# Patient Record
Sex: FEMALE | Race: BLACK OR AFRICAN AMERICAN
[De-identification: names, ages, dates, MRNs, and addresses within clinical notes are randomized per-mention and may not be internally consistent; named-entity substitution may affect disease eponyms.]

---

## 2017-02-01 ENCOUNTER — HOSPITAL ENCOUNTER (EMERGENCY)
Dept: HOSPITAL 74 - JERFT | Age: 32
Discharge: HOME | End: 2017-02-01
Payer: COMMERCIAL

## 2017-02-01 VITALS — SYSTOLIC BLOOD PRESSURE: 145 MMHG | TEMPERATURE: 98 F | HEART RATE: 93 BPM | DIASTOLIC BLOOD PRESSURE: 82 MMHG

## 2017-02-01 VITALS — BODY MASS INDEX: 26.6 KG/M2

## 2017-02-01 DIAGNOSIS — S39.012A: Primary | ICD-10-CM

## 2017-02-01 DIAGNOSIS — D64.9: ICD-10-CM

## 2017-02-01 DIAGNOSIS — I10: ICD-10-CM
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
## 2017-02-01 LAB
ANION GAP SERPL CALC-SCNC: 10 MMOL/L (ref 8–16)
APPEARANCE UR: CLEAR
BASOPHILS # BLD: 1 % (ref 0–2)
BILIRUB UR STRIP.AUTO-MCNC: NEGATIVE MG/DL
CALCIUM SERPL-MCNC: 9 MG/DL (ref 8.5–10.1)
CO2 SERPL-SCNC: 28 MMOL/L (ref 21–32)
COLOR UR: (no result)
CREAT SERPL-MCNC: 1 MG/DL (ref 0.55–1.02)
DEPRECATED RDW RBC AUTO: 13.7 % (ref 11.6–15.6)
EOSINOPHIL # BLD: 2.4 % (ref 0–4.5)
GLUCOSE SERPL-MCNC: 102 MG/DL (ref 74–106)
HYALINE CASTS URNS QL MICRO: 1 /LPF
KETONES UR QL STRIP: NEGATIVE
LEUKOCYTE ESTERASE UR QL STRIP.AUTO: NEGATIVE
MCH RBC QN AUTO: 30.4 PG (ref 25.7–33.7)
MCHC RBC AUTO-ENTMCNC: 34.7 G/DL (ref 32–36)
MCV RBC: 87.5 FL (ref 80–96)
MUCOUS THREADS URNS QL MICRO: (no result)
NEUTROPHILS # BLD: 48.8 % (ref 42.8–82.8)
NITRITE UR QL STRIP: NEGATIVE
PH UR: 5 [PH] (ref 5–8)
PLATELET # BLD AUTO: 268 K/MM3 (ref 134–434)
PMV BLD: 10 FL (ref 7.5–11.1)
PROT UR QL STRIP: NEGATIVE
PROT UR QL STRIP: NEGATIVE
RBC # BLD AUTO: 17 /HPF (ref 0–3)
RBC # UR STRIP: (no result) /UL
SP GR UR: 1.02 (ref 1–1.03)
UROBILINOGEN UR STRIP-MCNC: NEGATIVE E.U./DL (ref 0.2–1)
WBC # BLD AUTO: 6.4 K/MM3 (ref 4–10)
WBC # UR AUTO: 2 /HPF (ref 3–5)

## 2017-02-01 NOTE — PDOC
History of Present Illness





- General


Chief Complaint: Pain


Stated Complaint: LOWER BACK PAIN, HIGH BP


Time Seen by Provider: 02/01/17 12:11


History Source: Patient


Exam Limitations: No Limitations





- History of Present Illness


Initial Comments: 





02/01/17 13:15


CHIEF COMPLAINT: Lower back pain b/l intermittent for one week 





HISTORY OF PRESENT ILLNESS:  Patient is a 31-year-old female with a history of 

hypertension and anemia here today complaining of bilateral lower back pain for 

one week with movement. Patient denies any radiation of pain down the legs or 

any incontinency or any saddle anesthesia. Patient denies any injuries however 

patient does  her 4 year old daughter without using proper lifting 

technique of bending her legs she bends over and uses her back muscles. Patient 

was worried that back problem might indicate that she has a problem with her 

kidneys patient googled information and was worried about her kidneys as a 

result of her search. 








Occurred: reports: other (one week )


Severity: reports: moderate


Pain Location: reports: back (b/l lower back )


Method of Injury: Yes: other (lifting her 5 y/o daughter )


Modifying Factors: improves with: None


Loss of Consciousness: no loss of consciousness


Associated Symptoms (Fall): denies symptoms





Past History





- Past Medical History


Allergies/Adverse Reactions: 


 Allergies











Allergy/AdvReac Type Severity Reaction Status Date / Time


 


No Known Allergies Allergy   Verified 02/01/17 11:47











Home Medications: 


Ambulatory Orders





NK [No Known Home Medication]  02/01/17 








HTN: Yes





- Psycho/Social/Smoking Cessation Hx


Anxiety: No


Suicidal Ideation: No


Smoking History: Never smoked


Hx Alcohol Use: Yes (SOCIAL)


Drug/Substance Use Hx: No


Substance Use Type: None





**Review of Systems





- Review of Systems


Able to Perform ROS?: Yes


Constitutional: No: Symptoms Reported


HEENTM: No: Symptoms Reported


Respiratory: No: Symptoms reported


Cardiac (ROS): No: Symptoms Reported


ABD/GI: No: Symptoms Reported


: No: Symptoms Reported


Musculoskeletal: Yes: Back Pain (b/l lower back )


Integumentary: No: Symptoms Reported


Neurological: No: Symptoms reported





*Physical Exam





- Vital Signs


 Last Vital Signs











Temp Pulse Resp BP Pulse Ox


 


 98.0 F   93 H  20   145/82   100 


 


 02/01/17 11:44  02/01/17 11:44  02/01/17 11:44  02/01/17 11:44  02/01/17 11:44














- Physical Exam


General Appearance: Yes: Appropriately Dressed


Respiratory/Chest: positive: Lungs Clear, Normal Breath Sounds.  negative: 

Chest Tender, Respiratory Distress


Cardiovascular: positive: Regular Rhythm, Regular Rate, S1, S2


Gastrointestinal/Abdominal: positive: Normal Bowel Sounds, Soft.  negative: 

Tender, Organomegaly, Distended, Guarding, Rebound, Tenderness, Hepatomegaly, 

Spleenomegaly


Musculoskeletal: positive: Normal Inspection, Other (b/l ).  negative: CVA 

Tenderness, CVA Tenderness (R), CVA Tenderness (L), Decreased Range of Motion, 

Vertebral Tenderness


Extremity: positive: Normal Capillary Refill, Normal Inspection, Normal Range 

of Motion


Integumentary: positive: Normal Color


Neurologic: positive: Alert, Normal Response, Respond to painful stimul, 

Responsive, Other (negative SLR b/l ).  negative: Motor Strength 5/5, Numbness, 

Sensory Deficit





ED Treatment Course





- LABORATORY


CBC & Chemistry Diagram: 


 02/01/17 12:50





 02/01/17 12:50





Medical Decision Making





- Medical Decision Making


02/01/17 13:16


 Patient is a 31-year-old female with a history of hypertension and anemia here 

today complaining of bilateral lower back pain for one week with movement. 

Patient denies any radiation of pain down the legs or any incontinency or any 

saddle anesthesia. Patient denies any injuries however patient does  her 

4 year old daughter without using proper lifting technique of bending her legs 

she bends over and uses her back muscles. Patient was worried that back problem 

might indicate that she has a problem with her kidneys patient googled 

information and was worried about her kidneys as a result of her search. Pt.has 

her menses presently 











Right lateral lower back pain strain 











plan:











CBC with differential


bmp 





urinalysis 


urine hcg 





acetaminophen 1000 mg 








 Laboratory Tests











  02/01/17 02/01/17 02/01/17





  12:50 12:50 12:50


 


WBC   6.4 


 


RBC   3.93 


 


Hgb   12.0 


 


Hct   34.4 


 


MCV   87.5 


 


MCHC   34.7 


 


RDW   13.7 


 


Plt Count   268 


 


MPV   10.0 


 


Neutrophils %   48.8 


 


Lymphocytes %   39.5 


 


Monocytes %   8.3 


 


Eosinophils %   2.4 


 


Basophils %   1.0 


 


Urine Color    Ltyellow


 


Urine Appearance    Clear


 


Urine pH    5.0


 


Ur Specific Gravity    1.019


 


Urine Protein    Negative


 


Urine Glucose (UA)    Negative


 


Urine Ketones    Negative


 


Urine Blood    3+ H


 


Urine Nitrite    Negative


 


Urine Bilirubin    Negative


 


Urine Urobilinogen    Negative


 


Ur Leukocyte Esterase    Negative


 


Urine HCG, Qual  Negative  

















02/01/17 13:17


 Laboratory Tests











  02/01/17





  12:50


 


Sodium  140


 


Potassium  3.7


 


Chloride  102


 


Carbon Dioxide  28


 


Anion Gap  10


 


BUN  13


 


Creatinine  1.0


 


Random Glucose  102


 


Calcium  9.0














02/01/17 13:18








02/01/17 13:37








02/01/17 14:12








02/01/17 14:15








*DC/Admit/Observation/Transfer


Diagnosis at time of Disposition: 


Low back strain


Qualifiers:


 Encounter type: initial encounter Qualified Code(s): S39.012A - Strain of 

muscle, fascia and tendon of lower back, initial encounter





- Discharge Dispostion


Disposition: HOME


Condition at time of disposition: Stable





- Referrals


Referrals: 


Eddie Kwan MD [Primary Care Provider] - 





- Patient Instructions


Additional Instructions: 














Use proper lifting technique swelling lifting any thing as shown here in 

emergency room











Return to emergency room if pain worsens or any radiation of pain down the legs 

or any incontinency or numbness of legs or groin














Take acetaminophen as needed as directed by  for pain














Follow-up with your primary care provider within the next few days

















patient voiced understanding of discharge instructions and all questions were 

answered





- Post Discharge Activity


Work/School Note:  Back to Work

## 2017-05-30 ENCOUNTER — HOSPITAL ENCOUNTER (EMERGENCY)
Dept: HOSPITAL 74 - JER | Age: 32
Discharge: HOME | End: 2017-05-30
Payer: COMMERCIAL

## 2017-05-30 VITALS — DIASTOLIC BLOOD PRESSURE: 84 MMHG | SYSTOLIC BLOOD PRESSURE: 138 MMHG | TEMPERATURE: 98.3 F | HEART RATE: 66 BPM

## 2017-05-30 VITALS — BODY MASS INDEX: 26.6 KG/M2

## 2017-05-30 DIAGNOSIS — G44.219: Primary | ICD-10-CM

## 2017-05-30 DIAGNOSIS — I15.8: ICD-10-CM

## 2017-05-30 LAB
ALBUMIN SERPL-MCNC: 4 G/DL (ref 3.4–5)
ALP SERPL-CCNC: 58 U/L (ref 45–117)
ALT SERPL-CCNC: 18 U/L (ref 12–78)
ANION GAP SERPL CALC-SCNC: 5 MMOL/L (ref 8–16)
APPEARANCE UR: CLEAR
AST SERPL-CCNC: 14 U/L (ref 15–37)
BASOPHILS # BLD: 0.9 % (ref 0–2)
BILIRUB SERPL-MCNC: 0.2 MG/DL (ref 0.2–1)
BILIRUB UR STRIP.AUTO-MCNC: NEGATIVE MG/DL
CALCIUM SERPL-MCNC: 9.1 MG/DL (ref 8.5–10.1)
CO2 SERPL-SCNC: 29 MMOL/L (ref 21–32)
COCKROFT - GAULT: 115.66
COLOR UR: YELLOW
CREAT SERPL-MCNC: 0.9 MG/DL (ref 0.55–1.02)
DEPRECATED RDW RBC AUTO: 13.9 % (ref 11.6–15.6)
EOSINOPHIL # BLD: 2.8 % (ref 0–4.5)
GLUCOSE SERPL-MCNC: 86 MG/DL (ref 74–106)
KETONES UR QL STRIP: NEGATIVE
LEUKOCYTE ESTERASE UR QL STRIP.AUTO: NEGATIVE
MCH RBC QN AUTO: 28.9 PG (ref 25.7–33.7)
MCHC RBC AUTO-ENTMCNC: 33.7 G/DL (ref 32–36)
MCV RBC: 85.8 FL (ref 80–96)
MUCOUS THREADS URNS QL MICRO: (no result)
NEUTROPHILS # BLD: 51.3 % (ref 42.8–82.8)
NITRITE UR QL STRIP: NEGATIVE
PH UR: 6 [PH] (ref 5–8)
PLATELET # BLD AUTO: 252 K/MM3 (ref 134–434)
PLATELET # BLD EST: ADEQUATE 10*3/UL
PLATELET COMMENT2: (no result)
PLATELET COMMENT3: (no result)
PMV BLD: 10.5 FL (ref 7.5–11.1)
PROT SERPL-MCNC: 8.1 G/DL (ref 6.4–8.2)
PROT UR QL STRIP: (no result)
PROT UR QL STRIP: NEGATIVE
RBC # BLD AUTO: 153 /HPF (ref 0–3)
RBC # UR STRIP: (no result) /UL
SP GR UR: 1.02 (ref 1–1.02)
TROPONIN I SERPL-MCNC: < 0.02 NG/ML (ref 0–0.05)
UROBILINOGEN UR STRIP-MCNC: NEGATIVE E.U./DL (ref 0.2–1)
WBC # BLD AUTO: 7.4 K/MM3 (ref 4–10)
WBC # UR AUTO: 4 /HPF (ref 3–5)

## 2017-05-30 PROCEDURE — 3E033GC INTRODUCTION OF OTHER THERAPEUTIC SUBSTANCE INTO PERIPHERAL VEIN, PERCUTANEOUS APPROACH: ICD-10-PCS

## 2017-05-30 NOTE — PDOC
History of Present Illness





- General


Chief Complaint: Lightheaded


Stated Complaint: HEADACHE


Time Seen by Provider: 05/30/17 19:31


History Source: Patient


Exam Limitations: No Limitations





- History of Present Illness


Initial Comments: 





05/30/17 20:35





31yo Female patient w/ PmHx: HTN presents to ED c/o h/a x 2 weeks getting worse

, with some trouble swallowing. Patient states she visited with her PMD and 

states he will not take me serious. Associated dizziness, seeing black spots. 

LNMP: Current. + Family hx: HTN, and "Renal problems."


Timing/Duration: reports: other (2 Weeks)


Severity: Yes: moderate


Associated Symptoms: denies: denies symptoms, confusion, fatigue, fever/chills, 

insomnia, loss of consciousness, muscle spasms, nausea/vomiting, numbness in 

legs/feet, paresthesia, ringing in ears, seizures, sleepy, slurred speech, 

tingling in legs/feet, trouble walking, vision changes, weakness, other





Past History





- Travel


Traveled outside of the country in the last 30 days: No


Close contact w/someone who was outside of country & ill: No





- Past Medical History


Allergies/Adverse Reactions: 


 Allergies











Allergy/AdvReac Type Severity Reaction Status Date / Time


 


No Known Allergies Allergy   Verified 05/30/17 19:31











Home Medications: 


Ambulatory Orders





Acetaminophen/Caffeine/Butalb [Fioricet -] 2 tab PO Q6H PRN #42 tablet MDD 8 

tabs 05/30/17 


Lisinopril 0 mg PO DAILY 05/30/17 


Metoprolol Tartrate [Lopressor -] 25 mg PO ASDIR PRN #30 tablet 05/30/17 








HTN: Yes





- Psycho/Social/Smoking Cessation Hx


Anxiety: No


Suicidal Ideation: No


Smoking History: Never smoked


Information on smoking cessation initiated: No


Hx Alcohol Use: No


Drug/Substance Use Hx: No


Substance Use Type: None





Neuro Specific PMHX





- Complaint Specific PMHX


Glaucoma: No


Herniated Disk: No


Laminectomy: No


Migraine: No


Multiple Sclerosis: No


Neuropathy: No


TIA: No





**Review of Systems





- Review of Systems


Able to Perform ROS?: Yes


Is the patient limited English proficient: No


Constitutional: No: Chills, Fever


HEENTM: Yes: Difficulty Swallowing, Other (Seeing Black Spots).  No: Blurred 

Vision, Recent change in vision, Double Vision


Respiratory: No: Shortness of Breath, Stridor, Wheezing


Cardiac (ROS): No: Chest Pain, Chest Tightness


ABD/GI: No: Constipated, Diarrhea, Nausea, Poor Appetite, Poor Fluid Intake, 

Vomiting


: No: Burning, Dysuria, Flank Pain, Hematuria, Pain


Musculoskeletal: No: Back Pain


Integumentary: No: Bruising, Erythema, Pruritus, Rash, Sweating


Neurological: Yes: Headache, Dizziness.  No: Numbness, Paresthesia, Seizure, 

Tingling, Tremors, Weakness, Unsteady Gait, Ataxia


All Other Systems: Reviewed and Negative





*Physical Exam





- Vital Signs


 Last Vital Signs











Temp Pulse Resp BP Pulse Ox


 


 97.3 F L  86   16   160/116   100 


 


 05/30/17 18:23  05/30/17 19:26  05/30/17 19:26  05/30/17 19:26  05/30/17 19:26














- Physical Exam


General Appearance: Yes: Nourished, Appropriately Dressed.  No: Apparent 

Distress, Mild Distress, Moderate Distress, Severe Distress


HEENT: positive: EOMI, MARGARET, Normal ENT Inspection, Normal Voice, Symmetrical, 

TMs Normal, Pharynx Normal.  negative: Photophobia, Pharyngeal Erythema, 

Tonsillar Exudate, Tonsillar Erythema, Nasal Congestion, Rhinorrhea, Sinus 

Tenderness, TM Bulging, TM Dull, TM Erythema


Neck: positive: Trachea midline, Normal Thyroid, Supple.  negative: Stridor, 

Lymphadenopathy (R), Lymphadenopathy (L), Tender lateral, Tender midline


Respiratory/Chest: positive: Lungs Clear, Normal Breath Sounds.  negative: 

Respiratory Distress, Accessory Muscle Use, Labored Respiration, Rapid RR, 

Crackles, Rales, Rhonchi, Stridor, Wheezing


Cardiovascular: positive: Regular Rhythm, Regular Rate


Gastrointestinal/Abdominal: positive: Normal Bowel Sounds, Soft.  negative: 

Tender, Distended, Guarding, Rebound, Tenderness


Musculoskeletal: positive: Normal Inspection.  negative: CVA Tenderness, 

Decreased Range of Motion, Vertebral Tenderness


Extremity: positive: Normal Capillary Refill, Normal Inspection, Normal Range 

of Motion, Pelvis Stable.  negative: Pedal Edema, Swelling, Calf Tenderness, 

Erythema, Inflammation


Integumentary: positive: Normal Color, Dry, Warm


Neurologic: positive: CNs II-XII NML intact, Fully Oriented, Alert, Normal Mood/

Affect, Normal Response, Motor Strength 5/5





ED Treatment Course





- LABORATORY


CBC & Chemistry Diagram: 


 05/30/17 20:20





 05/30/17 20:20





- RADIOLOGY


Radiology Studies Ordered: 














 Category Date Time Status


 


 HEAD CT WITHOUT CONTRAST [CT] Stat CT Scan  05/30/17 20:04 Ordered














*DC/Admit/Observation/Transfer


Diagnosis at time of Disposition: 


Headache


Qualifiers:


 Headache type: tension-type Headache chronicity pattern: episodic headache 

Intractability: not intractable Qualified Code(s): G44.219 - Episodic tension-

type headache, not intractable





Hypertension


Qualifiers:


 Hypertension type: other secondary hypertension Qualified Code(s): I15.8 - 

Other secondary hypertension





- Discharge Dispostion


Disposition: HOME


Condition at time of disposition: Improved


Admit: No





- Prescriptions


Prescriptions: 


Acetaminophen/Caffeine/Butalb [Fioricet -] 2 tab PO Q6H PRN #42 tablet MDD 8 

tabs


 PRN Reason: Headache


Metoprolol Tartrate [Lopressor -] 25 mg PO ASDIR PRN #30 tablet


 PRN Reason: Hypertension





- Referrals


Referrals: 


Eddie Kwan MD [Primary Care Provider] - 


Juan Manuel Zendejas MD [Staff Physician] - 





- Patient Instructions


Printed Discharge Instructions:  High Blood Pressure, DI for Headache


Additional Instructions: 


FOLLOW UP WITH DR. ZENDEJAS (CARDIOLOGY) REGARDING HIGH BLOOD PRESSURE. CALL 

TO SCHEDULE APPOINTMENT. TAKE MEDICATIONS AS PRESCRIBED. TAKE LOPRESSOR 25 MG 

DAILY AS NEEDED FOR HIGH BLOOD PRESSURE READINGS AFTER TAKING ROUTINE 

MEDICATIONS. FIORICET FOR HEADACHE, DO NOT TAKE EXTRA TYLENOL WITH THIS 

MEDICATION. DRINK PLENTY WATER. RETURN IF ANY CONCERNS FOR FURTHER EVALUATION.





- Post Discharge Activity


Work/School Note:  Back to Work

## 2017-05-30 NOTE — PDOC
*Physical Exam





- Vital Signs


 Last Vital Signs











Temp Pulse Resp BP Pulse Ox


 


 97.3 F L  86   16   160/116   100 


 


 05/30/17 18:23  05/30/17 19:26  05/30/17 19:26  05/30/17 19:26  05/30/17 19:26














ED Treatment Course





- LABORATORY


CBC & Chemistry Diagram: 


 05/30/17 20:20





 05/30/17 20:20





- ADDITIONAL ORDERS


Additional order review: 


 Laboratory  Results











  05/30/17





  20:20


 


Sodium  140


 


Potassium  4.6  D


 


Chloride  106


 


Carbon Dioxide  29


 


Anion Gap  5 L


 


BUN  13


 


Creatinine  0.9


 


Creat Clearance w eGFR  > 60


 


Random Glucose  86


 


Calcium  9.1


 


Total Bilirubin  0.2


 


AST  14 L


 


ALT  18


 


Alkaline Phosphatase  58


 


Creatine Kinase  110


 


Troponin I  < 0.02


 


Total Protein  8.1


 


Albumin  4.0








 











  05/30/17





  20:20


 


RBC  4.24


 


MCV  85.8


 


MCHC  33.7


 


RDW  13.9


 


MPV  10.5


 


Neutrophils %  51.3


 


Lymphocytes %  36.8


 


Monocytes %  8.2


 


Eosinophils %  2.8


 


Basophils %  0.9














- Medications


Given in the ED: 


ED Medications














Discontinued Medications














Generic Name Dose Route Start Last Admin





  Trade Name Freq  PRN Reason Stop Dose Admin


 


Labetalol HCl  5 mg 05/30/17 20:48 05/30/17 20:55





  Normodyne Injection -  IVPUSH 05/30/17 20:49  5 mg





  ONCE ONE   Administration














Medical Decision Making





- Medical Decision Making





05/30/17 20:59


agree with care from [facility]





*DC/Admit/Observation/Transfer


Diagnosis at time of Disposition: 


 Headache, Hypertension





- Prescriptions


Prescriptions: 


Acetaminophen/Caffeine/Butalb [Fioricet -] 2 tab PO Q6H PRN #42 tablet MDD 8 

tabs


 PRN Reason: Headache


Metoprolol Tartrate [Lopressor -] 25 mg PO ASDIR PRN #30 tablet


 PRN Reason: Hypertension





- Referrals


Referrals: 


Juan Manuel Zendejas MD [Staff Physician] - 


Eddie Kwan MD [Primary Care Provider] - 





- Patient Instructions


Printed Discharge Instructions:  High Blood Pressure, DI for Headache


Additional Instructions: 


FOLLOW UP WITH DR. ZENDEJAS (CARDIOLOGY) REGARDING HIGH BLOOD PRESSURE. CALL 

TO SCHEDULE APPOINTMENT. TAKE MEDICATIONS AS PRESCRIBED. TAKE LOPRESSOR 25 MG 

DAILY AS NEEDED FOR HIGH BLOOD PRESSURE READINGS AFTER TAKING ROUTINE 

MEDICATIONS. FIORICET FOR HEADACHE, DO NOT TAKE EXTRA TYLENOL WITH THIS 

MEDICATION. DRINK PLENTY WATER. RETURN IF ANY CONCERNS FOR FURTHER EVALUATION.





- Post Discharge Activity


Work/School Note:  Back to Work

## 2017-09-05 PROBLEM — Z00.00 ENCOUNTER FOR PREVENTIVE HEALTH EXAMINATION: Status: ACTIVE | Noted: 2017-09-05

## 2017-09-08 ENCOUNTER — APPOINTMENT (OUTPATIENT)
Dept: PULMONOLOGY | Facility: CLINIC | Age: 32
End: 2017-09-08